# Patient Record
(demographics unavailable — no encounter records)

---

## 2024-11-11 NOTE — ASSESSMENT
[FreeTextEntry1] : 1. UR - cont CIC 4-6x daily w/ speedy cath. Cont finasteride.  2. PSA 1.03, risks of overtesting reviewed.

## 2024-11-11 NOTE — HISTORY OF PRESENT ILLNESS
[FreeTextEntry1] : DARIELA MARISCAL is a 78 year male who presents with BPH - performing CIC 4-7x daily using speedy cath. Mr. MARISCAL has been treated with finasteride, denies uti symptoms   The patient is not sexually active  here for VV - converted to phone call due to technical difficulty. [Urinary Retention] : urinary retention [Urinary Urgency] : no urinary urgency [Nocturia] : no nocturia

## 2025-06-26 NOTE — HISTORY OF PRESENT ILLNESS
[Urinary Retention] : urinary retention [FreeTextEntry1] : CC: Urinary Retention  DARIELA MARISCAL is a 78 year male who presents with BPH - performing CIC 4-7x daily using speedy cath. Mr. MARISCAL has been treated with finasteride.  The patient is not sexually active

## 2025-06-26 NOTE — ASSESSMENT
[FreeTextEntry1] : 1. UR - cont CIC 4-6x daily w/ speedy cath. Cont finasteride.   2. PSA 1.03, risks of overtesting reviewed.  3. balanitis - treat w/ clotrimazole/ bethamesasone

## 2025-06-26 NOTE — PHYSICAL EXAM
[General Appearance - In No Acute Distress] : no acute distress [Heart Rate And Rhythm] : heart rate and rhythm were normal [] : no respiratory distress [Abdomen Soft] : soft [Normal Station and Gait] : the gait and station were normal for the patient's age [Skin Turgor] : supple [No Focal Deficits] : no focal deficits

## 2025-07-25 NOTE — ASSESSMENT
[FreeTextEntry1] : a/p  1. UR - cont CIC 4-6x daily w/ speedy cath. Cont finasteride.   2. PSA 1.03, risks of overtesting reviewed.  3. balanitis - resolved  4. hx of hydro - renal us, cbc, cmp  5. trouble cathing - cysto

## 2025-07-25 NOTE — PHYSICAL EXAM
[General Appearance - In No Acute Distress] : no acute distress [Heart Rate And Rhythm] : heart rate and rhythm were normal [] : no respiratory distress [Abdomen Soft] : soft [Normal Station and Gait] : the gait and station were normal for the patient's age [Skin Turgor] : supple [No Focal Deficits] : no focal deficits [de-identified] : pt deferred

## 2025-07-25 NOTE — HISTORY OF PRESENT ILLNESS
[Urinary Retention] : urinary retention [FreeTextEntry1] : CC: Urinary Retention  DARIELA MARISCAL is a 78 year male who presents with BPH - performing CIC 4-7x daily using speedy cath. Mr. MARISCAL has been treated with finasteride. notes some trouble w/ cathing  The patient is not sexually active.